# Patient Record
Sex: MALE | Race: WHITE | Employment: FULL TIME | URBAN - METROPOLITAN AREA
[De-identification: names, ages, dates, MRNs, and addresses within clinical notes are randomized per-mention and may not be internally consistent; named-entity substitution may affect disease eponyms.]

---

## 2019-03-08 PROBLEM — Z72.0 TOBACCO USE: Status: ACTIVE | Noted: 2019-03-08

## 2020-02-09 PROBLEM — E66.9 OBESITY (BMI 30-39.9): Status: ACTIVE | Noted: 2020-02-09

## 2020-02-09 PROBLEM — F17.210 CIGARETTE NICOTINE DEPENDENCE WITHOUT COMPLICATION: Status: ACTIVE | Noted: 2019-03-08

## 2020-02-09 PROBLEM — R03.0 PREHYPERTENSION: Status: ACTIVE | Noted: 2020-02-09

## 2022-07-18 ENCOUNTER — TELEPHONE (OUTPATIENT)
Dept: PEDIATRICS CLINIC | Facility: CLINIC | Age: 32
End: 2022-07-18

## 2022-07-18 NOTE — TELEPHONE ENCOUNTER
Mother of pt baby would like a meet & greet with Dr. Maribel Parsons. Due date in 11/15/22.     Please advise